# Patient Record
Sex: MALE | Race: WHITE | Employment: OTHER | URBAN - METROPOLITAN AREA
[De-identification: names, ages, dates, MRNs, and addresses within clinical notes are randomized per-mention and may not be internally consistent; named-entity substitution may affect disease eponyms.]

---

## 2018-07-17 ENCOUNTER — HOSPITAL ENCOUNTER (OUTPATIENT)
Dept: CARDIAC CATH/INVASIVE PROCEDURES | Age: 83
Discharge: HOME | End: 2018-07-17
Attending: INTERNAL MEDICINE | Admitting: INTERNAL MEDICINE
Payer: MEDICARE

## 2018-07-17 VITALS
HEIGHT: 73 IN | SYSTOLIC BLOOD PRESSURE: 134 MMHG | WEIGHT: 210 LBS | BODY MASS INDEX: 27.83 KG/M2 | HEART RATE: 60 BPM | OXYGEN SATURATION: 98 % | RESPIRATION RATE: 18 BRPM | DIASTOLIC BLOOD PRESSURE: 68 MMHG | TEMPERATURE: 98 F

## 2018-07-17 DIAGNOSIS — I44.2 AV BLOCK, 3RD DEGREE (HCC): Primary | ICD-10-CM

## 2018-07-17 PROCEDURE — 74011000250 HC RX REV CODE- 250: Performed by: INTERNAL MEDICINE

## 2018-07-17 PROCEDURE — 77030012935 HC DRSG AQUACEL BMS -B

## 2018-07-17 PROCEDURE — 77030031139 HC SUT VCRL2 J&J -A

## 2018-07-17 PROCEDURE — 77030037029 HC IMPL ENV ICD ANTIBACT ABSRB TYRX MEDT -G

## 2018-07-17 PROCEDURE — 74011250636 HC RX REV CODE- 250/636: Performed by: INTERNAL MEDICINE

## 2018-07-17 PROCEDURE — 77030039046 HC PAD DEFIB RADIOTRNSPNT CNMD -B

## 2018-07-17 PROCEDURE — 77030028700 HC BLD TISS PLSM MEDT -E

## 2018-07-17 PROCEDURE — 77030018673

## 2018-07-17 PROCEDURE — C1785 PMKR, DUAL, RATE-RESP: HCPCS

## 2018-07-17 PROCEDURE — 74011000272 HC RX REV CODE- 272: Performed by: INTERNAL MEDICINE

## 2018-07-17 PROCEDURE — 33228 REMV&REPLC PM GEN DUAL LEAD: CPT

## 2018-07-17 RX ORDER — MIDAZOLAM HYDROCHLORIDE 1 MG/ML
1-10 INJECTION, SOLUTION INTRAMUSCULAR; INTRAVENOUS
Status: DISCONTINUED | OUTPATIENT
Start: 2018-07-17 | End: 2018-07-17

## 2018-07-17 RX ORDER — ATROPINE SULFATE 0.1 MG/ML
.5-1 INJECTION INTRAVENOUS AS NEEDED
Status: DISCONTINUED | OUTPATIENT
Start: 2018-07-17 | End: 2018-07-17

## 2018-07-17 RX ORDER — LIDOCAINE HYDROCHLORIDE 10 MG/ML
10-30 INJECTION INFILTRATION; PERINEURAL
Status: DISCONTINUED | OUTPATIENT
Start: 2018-07-17 | End: 2018-07-17

## 2018-07-17 RX ORDER — HYDROCODONE BITARTRATE AND ACETAMINOPHEN 5; 325 MG/1; MG/1
1 TABLET ORAL
Qty: 20 TAB | Refills: 0 | Status: SHIPPED | OUTPATIENT
Start: 2018-07-17

## 2018-07-17 RX ORDER — CEFAZOLIN SODIUM/WATER 2 G/20 ML
2 SYRINGE (ML) INTRAVENOUS ONCE
Status: COMPLETED | OUTPATIENT
Start: 2018-07-17 | End: 2018-07-17

## 2018-07-17 RX ORDER — SODIUM CHLORIDE 0.9 % (FLUSH) 0.9 %
5 SYRINGE (ML) INJECTION AS NEEDED
Status: DISCONTINUED | OUTPATIENT
Start: 2018-07-17 | End: 2018-07-17

## 2018-07-17 RX ORDER — CEFAZOLIN SODIUM/WATER 2 G/20 ML
2 SYRINGE (ML) INTRAVENOUS ONCE
Status: DISCONTINUED | OUTPATIENT
Start: 2018-07-17 | End: 2018-07-17

## 2018-07-17 RX ORDER — FENTANYL CITRATE 50 UG/ML
25-200 INJECTION, SOLUTION INTRAMUSCULAR; INTRAVENOUS
Status: DISCONTINUED | OUTPATIENT
Start: 2018-07-17 | End: 2018-07-17

## 2018-07-17 RX ADMIN — SODIUM CHLORIDE 50000 UNITS: 900 IRRIGANT IRRIGATION at 08:48

## 2018-07-17 RX ADMIN — MIDAZOLAM HYDROCHLORIDE 2 MG: 1 INJECTION, SOLUTION INTRAMUSCULAR; INTRAVENOUS at 08:38

## 2018-07-17 RX ADMIN — FENTANYL CITRATE 25 MCG: 50 INJECTION, SOLUTION INTRAMUSCULAR; INTRAVENOUS at 08:38

## 2018-07-17 RX ADMIN — MIDAZOLAM HYDROCHLORIDE 1 MG: 1 INJECTION, SOLUTION INTRAMUSCULAR; INTRAVENOUS at 08:44

## 2018-07-17 RX ADMIN — MIDAZOLAM HYDROCHLORIDE 2 MG: 1 INJECTION, SOLUTION INTRAMUSCULAR; INTRAVENOUS at 08:30

## 2018-07-17 RX ADMIN — FENTANYL CITRATE 25 MCG: 50 INJECTION, SOLUTION INTRAMUSCULAR; INTRAVENOUS at 08:44

## 2018-07-17 RX ADMIN — LIDOCAINE HYDROCHLORIDE 20 ML: 10 INJECTION, SOLUTION INFILTRATION; PERINEURAL at 08:42

## 2018-07-17 RX ADMIN — FENTANYL CITRATE 25 MCG: 50 INJECTION, SOLUTION INTRAMUSCULAR; INTRAVENOUS at 08:50

## 2018-07-17 RX ADMIN — FENTANYL CITRATE 25 MCG: 50 INJECTION, SOLUTION INTRAMUSCULAR; INTRAVENOUS at 08:30

## 2018-07-17 RX ADMIN — Medication 2 G: at 08:18

## 2018-07-17 NOTE — PROGRESS NOTES
Cardiac Cath Lab Procedure Area Arrival Note:    Mane [place] arrived to Cardiac Cath Lab, Procedure Area. Patient identifiers verified with NAME and DATE OF BIRTH. Procedure verified with patient. Consent forms verified. Allergies verified. Patient informed of procedure and plan of care. Questions answered with review. Patient voiced understanding of procedure and plan of care. Patient on cardiac monitor, non-invasive blood pressure, SPO2 monitor. On RA and placed on O2 @ 2 lpm via NC.  IV of NSS on pump at 50 ml/hr. Patient status doing well without problems. Patient is A&Ox 4. Patient reports no complaints of pain or shortness of breath. Patient medicated during procedure with orders obtained and verified by Dr. Dee Umanzor. Refer to patients Cardiac Cath Lab PROCEDURE REPORT for vital signs, assessment, status, and response during procedure, printed at end of case. Printed report on chart or scanned into chart. 3631- Transfer to [address] [place] [address] from Procedure Area    Verbal report given to MADISON Maddox on [facility] being transferred to Cardiac Cath Lab  for routine progression of care   Patient is post PPM Generator Change procedure. Patient stable upon transfer to . Report consisted of patients Situation, Background, Assessment and   Recommendations(SBAR). Information from the following report(s) SBAR, Procedure Summary and MAR was reviewed with the receiving nurse. Opportunity for questions and clarification was provided. Patient medicated during procedure with orders obtained and verified by Dr. Dee Umanzor. Refer to patient PROCEDURE REPORT for vital signs, assessment, status, and response during procedure.

## 2018-07-17 NOTE — IP AVS SNAPSHOT
[address] 
[phone] Patient: Kyrie Huffman MRN: [id number] :[id number] About your hospitalization You were admitted on:  2018 You last received care in the:  [place] [address], [facility]/s Dr PEÑA SAUCEDO You were discharged on:  2018 Why you were hospitalized Your primary diagnosis was:  Not on File Follow-up Information Follow up With Details Comments Contact Info Governbertha Bowman MD   [address] [facility] [address] [place] [address] 
[phone] Discharge Orders None A check alonzo indicates which time of day the medication should be taken. My Medications START taking these medications Instructions Each Dose to Equal  
 Morning Noon Evening Bedtime HYDROcodone-acetaminophen 5-325 mg per tablet Commonly known as:  Albin Bae Your last dose was: Your next dose is: Take 1 Tab by mouth every four (4) hours as needed for Pain. Max Daily Amount: 6 Tabs. Indications: Pain 1 Tab CONTINUE taking these medications Instructions Each Dose to Equal  
 Morning Noon Evening Bedtime  
 amLODIPine 5 mg tablet Commonly known as:  Walter Lagos Your last dose was: Your next dose is: Take 5 mg by mouth daily. Indications: HYPERTENSION  
 5 mg  
    
   
   
   
  
 benazepril 40 mg tablet Commonly known as:  LOTENSIN Your last dose was: Your next dose is: Take 40 mg by mouth daily. 40 mg  
    
   
   
   
  
 omeprazole 20 mg capsule Commonly known as:  PRILOSEC Your last dose was: Your next dose is: Take 20 mg by mouth daily. 20 mg  
    
   
   
   
  
 oxyCODONE-acetaminophen 5-325 mg per tablet Commonly known as:  PERCOCET Your last dose was: Your next dose is: Take 1 Tab by mouth every four (4) hours as needed for Pain. Max Daily Amount: 6 Tabs. 1 Tab Where to Get Your Medications Information on where to get these meds will be given to you by the nurse or doctor. ! Ask your nurse or doctor about these medications HYDROcodone-acetaminophen 5-325 mg per tablet Opioid Education Prescription Opioids: What You Need to Know: 
 
Prescription opioids can be used to help relieve moderate-to-severe pain and are often prescribed following a surgery or injury, or for certain health conditions. These medications can be an important part of treatment but also come with serious risks. Opioids are strong pain medicines. Examples include hydrocodone, oxycodone, fentanyl, and morphine. Heroin is an example of an illegal opioid. It is important to work with your health care provider to make sure you are getting the safest, most effective care. WHAT ARE THE RISKS AND SIDE EFFECTS OF OPIOID USE? Prescription opioids carry serious risks of addiction and overdose, especially with prolonged use. An opioid overdose, often marked by slow breathing, can cause sudden death. The use of prescription opioids can have a number of side effects as well, even when taken as directed. · Tolerance-meaning you might need to take more of a medication for the same pain relief · Physical dependence-meaning you have symptoms of withdrawal when the medication is stopped. Withdrawal symptoms can include nausea, sweating, chills, diarrhea, stomach cramps, and muscle aches. Withdrawal can last up to several weeks, depending on which drug you took and how long you took it. · Increased sensitivity to pain · Constipation · Nausea, vomiting, and dry mouth · Sleepiness and dizziness · Confusion · Depression · Low levels of testosterone that can result in lower sex drive, energy, and strength · Itching and sweating RISKS ARE GREATER WITH:      
· History of drug misuse, substance use disorder, or overdose · Mental health conditions (such as depression or anxiety) · Sleep apnea · Older age (72 years or older) · Pregnancy Avoid alcohol while taking prescription opioids. Also, unless specifically advised by your health care provider, medications to avoid include: · Benzodiazepines (such as Xanax or Valium) · Muscle relaxants (such as Soma or Flexeril) · Hypnotics (such as Ambien or Lunesta) · Other prescription opioids KNOW YOUR OPTIONS Talk to your health care provider about ways to manage your pain that don't involve prescription opioids. Some of these options may actually work better and have fewer risks and side effects. Options may include: 
· Pain relievers such as acetaminophen, ibuprofen, and naproxen · Some medications that are also used for depression or seizures · Physical therapy and exercise · Counseling to help patients learn how to cope better with triggers of pain and stress. · Application of heat or cold compress · Massage therapy · Relaxation techniques Be Informed Make sure you know the name of your medication, how much and how often to take it, and its potential risks & side effects. IF YOU ARE PRESCRIBED OPIOIDS FOR PAIN: 
· Never take opioids in greater amounts or more often than prescribed. Remember the goal is not to be pain-free but to manage your pain at a tolerable level. · Follow up with your primary care provider to: · Work together to create a plan on how to manage your pain. · Talk about ways to help manage your pain that don't involve prescription opioids. · Talk about any and all concerns and side effects. · Help prevent misuse and abuse. · Never sell or share prescription opioids · Help prevent misuse and abuse. · Store prescription opioids in a secure place and out of reach of others (this may include visitors, children, friends, and family). · Safely dispose of unused/unwanted prescription opioids: Find your community drug take-back program or your pharmacy mail-back program, or flush them down the toilet, following guidance from the Food and Drug Administration (www.fda.gov/Drugs/ResourcesForYou). · Visit www.cdc.gov/drugoverdose to learn about the risks of opioid abuse and overdose. · If you believe you may be struggling with addiction, tell your health care provider and ask for guidance or call [employer] at 0-671-679-MYSI. Discharge Instructions PACEMAKER Generator Change INSTRUCTIONS SHEET 1. Do not drive for 48 hours 2. No signing and legal documents for 24hours. · Keep your incision dry for 7 days. DO NOT put salves, ointments, and/or lotions on the incision. Only take a tub bath during this time; NO showers. ·  
· Call us IMMEDIATELY if you develop fever, pain, redness, and/or drainage at the pacemaker arm. · Do not lift more than 10 pounds for 1 week. Introducing [facility]! Mary Huber introduces [employer] patient portal. Now you can access parts of your medical record, email your doctor's office, and request medication refills online. 1. In your internet browser, go to [email or web address] 2. Click on the First Time User? Click Here link in the Sign In box. You will see the New Member Sign Up page. 3. Enter your [employer] Access Code exactly as it appears below. You will not need to use this code after youve completed the sign-up process. If you do not sign up before the expiration date, you must request a new code. · [employer] Access Code: [email or web address] Expires: 10/1/2018 10:26 AM 
 
4. Enter the last four digits of your Social Security Number (xxxx) and Date of Birth (mm/dd/yyyy) as indicated and click Submit. You will be taken to the next sign-up page. 5. Create a [employer] ID. This will be your [employer] login ID and cannot be changed, so think of one that is secure and easy to remember. 6. Create a [employer] password. You can change your password at any time. 7. Enter your Password Reset Question and Answer. This can be used at a later time if you forget your password. 8. Enter your e-mail address. You will receive e-mail notification when new information is available in [facility] 9. Click Sign Up. You can now view and download portions of your medical record. 10. Click the Download Summary menu link to download a portable copy of your medical information. If you have questions, please visit the Frequently Asked Questions section of the [employer] website. Remember, [employer] is NOT to be used for urgent needs. For medical emergencies, dial 911. Now available from your iPhone and Android! Introducing Faisal Tidwell As a [facility] patient, I wanted to make you aware of our electronic visit tool called Faisal Aarontanyarosa. [facility] 24/7 allows you to connect within minutes with a medical provider 24 hours a day, seven days a week via a mobile device or tablet or logging into a secure website from your computer. You can access Faisal Tidwell from anywhere in the [place]. A virtual visit might be right for you when you have a simple condition and feel like you just dont want to get out of bed, or cant get away from work for an appointment, when your regular [facility] provider is not available (evenings, weekends or holidays), or when youre out of town and need minor care. Electronic visits cost only $49 and if the [facility] 24/7 provider determines a prescription is needed to treat your condition, one can be electronically transmitted to a nearby pharmacy*. Please take a moment to enroll today if you have not already done so. The enrollment process is free and takes just a few minutes.   To enroll, please download the [facility] 24/7 crystal to your tablet or phone, or visit [email or web address] to enroll on your computer. And, as an [address] patient with a [employer] account, the results of your visits will be scanned into your electronic medical record and your primary care provider will be able to view the scanned results. We urge you to continue to see your regular [facility] provider for your ongoing medical care. And while your primary care provider may not be the one available when you seek a [employer] virtual visit, the peace of mind you get from getting a real diagnosis real time can be priceless. For more information on [employer], view our Frequently Asked Questions (FAQs) at [email or web address]. Sincerely, 
 
José Quintanilla MD 
Chief Medical Officer [facility] *:  certain medications cannot be prescribed via [employer] Providers Seen During Your Hospitalization Provider Specialty Primary office phone Matteo Vela MD Cardiology [phone] Your Primary Care Physician (PCP) Primary Care Physician Office Phone Office Fax Sirimilla Pushpa [phone] You are allergic to the following Allergen Reactions Celebrex (Celecoxib) Other (comments) Ringing and pain in ears Recent Documentation Height Weight BMI Smoking Status 1.854 m 95.3 kg 27.71 kg/m2 Former Smoker Emergency Contacts Name Discharge Info Relation Home Work Mobile Veronique López DISCHARGE CAREGIVER [3] Child [2] [phone] Patient Belongings The following personal items are in your possession at time of discharge: 
     Visual Aid: None Please provide this summary of care documentation to your next provider.  
  
  
 
  
Signatures-by signing, you are acknowledging that this After Visit Summary has been reviewed with you and you have received a copy. Patient Signature:  ____________________________________________________________ Date:  ____________________________________________________________  
  
[facility] Provider Signature:  ____________________________________________________________ Date:  ____________________________________________________________

## 2018-07-17 NOTE — PROCEDURES
Indication: Pacemaker at electrive replacement. V paced >99% but EF 60-65% and no HF symptoms. PROCEDURES PERFORMED:  1. Conscious sedation. 2. REMOVAL OF Cardiac Rhythm Device [facility] SN [id number]  3. Permanent SJM Device  Implantation:      A: ASHLI Assurity MRi [id number]        COMPLICATIONS:None. ESTIMATED BLOOD LOSS: Minimal  SPECIMENS: None  ASSISTANTS: See Erwin    PROCEDURAL NOTE:  The patient was brought to the laboratory in a sedated postabsorptive state. Conscious sedation was administered by nursing staff under my supervision. The left chest wall was prepped and draped in the usual fashion and anesthetized with 20 mL of 2% lidocaine. Incision was made over the deltopectoral groove and extended to the level of the pectoralis fascia. A pocket was opened and the cardiac rhythm device was exposed and removed. The leads were inspected and noted to be visually intact. The leads were tested and thresholds were acceptable. The device pocket was flushed with antibiotic containing sterile saline  solution. The leads were then attached to generator. Leads and generator placed in the pocket with the leads posterior to the generator. Subcutaneous tissue closed in 2 layers utilizing #2-0 Vicryl. Skin closed with dermabond glue with an excellent final result. The patient was transferred to the holding area    No complications were noted. PACEMAKER THRESHOLD TESTIN. Ventricular R-wave:6.5millivolts; Ventricular capture threshold ( 0.5 milliseconds pulse width): Voltage 2.5 volts @ 0.4 msec, impedance 560 ohms,  Atrial P-wave: 2.1 millivolts;  Atrial capture threshold ( 0.5milliseconds pulse width): Voltage 0.5 volts, impedance 410 ohms

## 2018-07-17 NOTE — PROGRESS NOTES
Cardiac Cath Lab Recovery Arrival Note:      Waymond Goltz arrived to Cardiac Cath Lab, Recovery Area. Staff introduced to patient. Patient identifiers verified with NAME and DATE OF BIRTH. Procedure verified with patient. Consent forms reviewed and signed by patient or authorized representative and verified. Allergies verified. Patient and family oriented to department. Patient and family informed of procedure and plan of care. Questions answered with review. Patient prepped for procedure, per orders from physician, prior to arrival.    Patient on cardiac monitor, non-invasive blood pressure, SPO2 monitor. On RA. Patient is A&Ox 4. Patient reports no c/o's. Patient in stretcher, in low position, with side rails up, call bell within reach, patient instructed to call if assistance as needed. Patient prep in: [address], [place] [address].    Patient family has pager # n/a  Family in: hospital.   Prep by: Gentry Santos RN

## 2018-07-17 NOTE — PROGRESS NOTES
TRANSFER - IN REPORT:    Verbal report received from [facility] on Omari Salgado  being received from procedural area for routine progression of care. Report consisted of patients Situation, Background, Assessment and Recommendations(SBAR). Information from the following report(s) Procedure Summary, MAR and Recent Results was reviewed with the receiving clinician. Opportunity for questions and clarification was provided. Assessment completed upon patients arrival to [address] [place] [address] and care assumed. Cardiac Cath Lab Recovery Arrival Note:    Omari Salgado arrived to [facility] recovery area. Patient procedure= PPI. Patient on cardiac monitor, non-invasive blood pressure, SPO2 monitor. On RA   IV  of NS on pump at 25 ml/hr. Patient status doing well without problems. Patient is A&Ox 3. Patient reports no c/o's. PROCEDURE SITE CHECK:    Procedure site:without any bleeding and hematoma, no pain/discomfort reported at procedure site. No change in patient status. Continue to monitor patient and status.

## 2018-07-17 NOTE — DISCHARGE INSTRUCTIONS
PACEMAKER Generator Change INSTRUCTIONS SHEET  1. Do not drive for 48 hours    2. No signing and legal documents for 24hours. · Keep your incision dry for 7 days. DO NOT put salves, ointments, and/or lotions on the incision. Only take a tub bath during this time; NO showers. ·   · Call us IMMEDIATELY if you develop fever, pain, redness, and/or drainage at the pacemaker arm. · Do not lift more than 10 pounds for 1 week.

## 2020-08-11 NOTE — PROGRESS NOTES
HISTORY OF PRESENTING ILLNESS      Saima Morrell is a 81 y.o. male with hypertension, dyslipidemia and dual chamber SJM pacemaker implanted in 2012 followed by gen change in 2018 presenting to establish with our device clinic to follow his pacemaker. Interrogation of his PPM demonstrated an elevated RV lead threshold however this appears to potentially be chronic. Denies chest pain, syncope, palpitations. PAST MEDICAL HISTORY     Past Medical History:   Diagnosis Date    Anxiety     Arthritis     osteoarthritis    Cancer ([facility] [address]     Squamous Cell Skin Cancer-had to have nose replaced.     GERD (gastroesophageal reflux disease)     Goiter     High cholesterol     Hypertension     Ill-defined condition     NOSE BLEEDS    Ill-defined condition 12/2015    SEPSIS, UNKNOWN ORIGIN    Pacemaker 2012    Pneumonia 2/13/15    Unspecified adverse effect of anesthesia     difficulty breathing due to nose reconstruction    UTI (urinary tract infection) 2/3/15           PAST SURGICAL HISTORY     Past Surgical History:   Procedure Laterality Date    HX APPENDECTOMY      HX GYN      COLONOSCOPY    HX HEENT  2010    exc skin ca, reconstructive nose surgery    HX MOHS PROCEDURES  2010    left x2    HX ORTHOPAEDIC  2013    Herniated Disk in back-FUSION L4-5    HX ORTHOPAEDIC      Right Shoulder Scope    HX ORTHOPAEDIC      RIGHT WRIST AND SHOULDER MUSCLE REATTACHMENT    HX ORTHOPAEDIC      CERVICAL FUSION    HX PACEMAKER  2012    HX UROLOGICAL  2011    TURP    NEUROLOGICAL PROCEDURE UNLISTED  7/2011    FUSION OF CERVICAL VERTEBRA/PLATE    TOTAL KNEE ARTHROPLASTY  12/2011    BILATERAL          ALLERGIES     Allergies   Allergen Reactions    Celebrex [Celecoxib] Other (comments)     Ringing and pain in ears          FAMILY HISTORY     Family History   Problem Relation Age of Onset    Cancer Mother         breast, kidney, bone    Heart Disease Father     Cancer Brother         COLON    Cancer Daughter         PANCREATIC    Anesth Problems Neg Hx     negative for cardiac disease       SOCIAL HISTORY     Social History     Socioeconomic History    Marital status:      Spouse name: Not on file    Number of children: Not on file    Years of education: Not on file    Highest education level: Not on file   Tobacco Use    Smoking status: Former Smoker     Packs/day: 1.00     Years: 20.00     Pack years: 20.00     Last attempt to quit: 1980     Years since quittin.6    Smokeless tobacco: Never Used   Substance and Sexual Activity    Alcohol use: Not Currently     Alcohol/week: 4.2 standard drinks     Types: 5 Shots of liquor per week     Comment: 4-5/WEEK    Drug use: No         MEDICATIONS     Current Outpatient Medications   Medication Sig    Xarelto 20 mg tab tablet TK 1 T PO QD    donepeziL (ARICEPT) 5 mg tablet TK 1 T PO QD    atorvastatin (LIPITOR) 20 mg tablet Take  by mouth daily.  ergocalciferol (ERGOCALCIFEROL) 1,250 mcg (50,000 unit) capsule ergocalciferol (vitamin D2) 1,250 mcg (50,000 unit) capsule   TAKE 1 CAPSULE BY MOUTH EVERY WEEK    vit A-vit C-vit E-zinc-copper (Eye Vitamin and Minerals) 7,160-113-100 unit-mg-unit tab Take  by mouth daily.  omeprazole (PRILOSEC) 20 mg capsule Take 20 mg by mouth daily.  HYDROcodone-acetaminophen (NORCO) 5-325 mg per tablet Take 1 Tab by mouth every four (4) hours as needed for Pain. Max Daily Amount: 6 Tabs. Indications: Pain    oxyCODONE-acetaminophen (PERCOCET) 5-325 mg per tablet Take 1 Tab by mouth every four (4) hours as needed for Pain. Max Daily Amount: 6 Tabs.  benazepril (LOTENSIN) 40 mg tablet Take 40 mg by mouth daily.  amlodipine (NORVASC) 5 mg tablet Take 5 mg by mouth daily. Indications: HYPERTENSION     No current facility-administered medications for this visit. I have reviewed the nurses notes, vitals, problem list, allergy list, medical history, family, social history and medications. REVIEW OF SYMPTOMS      General: Pt denies excessive weight gain or loss. Pt is able to conduct ADL's  HEENT: Denies blurred vision, headaches, hearing loss, epistaxis and difficulty swallowing. Respiratory: Denies cough, congestion, shortness of breath, AGUILAR, wheezing or stridor. Cardiovascular: Denies precordial pain, palpitations, edema or PND  Gastrointestinal: Denies poor appetite, indigestion, abdominal pain or blood in stool  Genitourinary: Denies hematuria, dysuria, increased urinary frequency  Musculoskeletal: Denies joint pain or swelling from muscles or joints  Neurologic: Denies tremor, paresthesias, headache, or sensory motor disturbance  Psychiatric: Denies confusion, insomnia, depression  Integumentray: Denies rash, itching or ulcers. Hematologic: Denies easy bruising, bleeding       PHYSICAL EXAMINATION      Vitals: see vitals section  General: Well developed, in no acute distress. HEENT: No jaundice, oral mucosa moist, no oral ulcers  Neck: Supple, no stiffness, no lymphadenopathy, supple  Heart:  Normal S1/S2 negative S3 or S4. Regular, no murmur, gallop or rub, no jugular venous distention  Respiratory: Clear bilaterally x 4, no wheezing or rales  Abdomen:   Soft, non-tender, bowel sounds are active. Extremities:  No edema, normal cap refill, no cyanosis. Musculoskeletal: No clubbing, no deformities  Neuro: A&Ox3, speech clear, gait stable, cooperative, no focal neurologic deficits  Skin: Skin color is normal. No rashes or lesions.  Non diaphoretic, moist.  Vascular: 2+ pulses symmetric in all extremities       DIAGNOSTIC DATA      EKG:        LABORATORY DATA      Lab Results   Component Value Date/Time    WBC 9.3 04/17/2016 02:42 AM    Hemoglobin (POC) 13.3 11/14/2013 11:05 AM    HGB 9.6 (L) 06/25/2016 04:56 AM    Hematocrit (POC) 39 11/14/2013 11:05 AM    HCT 28.6 (L) 04/17/2016 02:42 AM    PLATELET 527 39/32/2106 02:42 AM    MCV 86.1 04/17/2016 02:42 AM      Lab Results   Component Value Date/Time    Sodium 137 06/23/2016 03:55 AM    Potassium 4.3 06/23/2016 03:55 AM    Chloride 105 06/23/2016 03:55 AM    CO2 24 06/23/2016 03:55 AM    Anion gap 8 06/23/2016 03:55 AM    Glucose 119 (H) 06/23/2016 03:55 AM    BUN 13 06/23/2016 03:55 AM    Creatinine 0.85 06/23/2016 03:55 AM    BUN/Creatinine ratio 15 06/23/2016 03:55 AM    GFR est AA >60 06/23/2016 03:55 AM    GFR est non-AA >60 06/23/2016 03:55 AM    Calcium 8.1 (L) 06/23/2016 03:55 AM    Bilirubin, total 1.2 (H) 12/08/2015 03:40 AM    Alk. phosphatase 110 12/08/2015 03:40 AM    Protein, total 6.5 12/08/2015 03:40 AM    Albumin 2.6 (L) 12/08/2015 03:40 AM    Globulin 3.9 12/08/2015 03:40 AM    A-G Ratio 0.7 (L) 12/08/2015 03:40 AM    ALT (SGPT) 24 12/08/2015 03:40 AM           ASSESSMENT      1. Pacemaker   A. SJM   B. Left sided   C. Dual chamber  2. Hypertension   3. Dyslipidemia       PLAN     Continue monitoring in device clinic with remote monitoring. ICD-10-CM ICD-9-CM    1. AV block, 3rd degree (HCC)  I44.2 426.0 AMB POC EKG ROUTINE W/ 12 LEADS, INTER & REP   2. Pacemaker  Z95.0 V45.01 AMB POC EKG ROUTINE W/ 12 LEADS, INTER & REP     Orders Placed This Encounter    AMB POC EKG ROUTINE W/ 12 LEADS, INTER & REP     Order Specific Question:   Reason for Exam:     Answer:   new patient    Xarelto 20 mg tab tablet     Sig: TK 1 T PO QD    donepeziL (ARICEPT) 5 mg tablet     Sig: TK 1 T PO QD    atorvastatin (LIPITOR) 20 mg tablet     Sig: Take  by mouth daily.  ergocalciferol (ERGOCALCIFEROL) 1,250 mcg (50,000 unit) capsule     Sig: ergocalciferol (vitamin D2) 1,250 mcg (50,000 unit) capsule   TAKE 1 CAPSULE BY MOUTH EVERY WEEK    vit A-vit C-vit E-zinc-copper (Eye Vitamin and Minerals) 7,160-113-100 unit-mg-unit tab     Sig: Take  by mouth daily. FOLLOW-UP     1 year      Thank you, Marita Balderas MD and Dr. Hilton Hilario for allowing me to participate in the care of this extraordinarily pleasant male.  Please do not hesitate to contact me for further questions/concerns.          Jair Milner MD  Cardiac Electrophysiology / Cardiology    [address]  [address] [facility], [facility], [place] [address]  [facility]    Robbi Eagle  [phone] / [phone] Fax   [phone] / [phone] Fax

## 2020-08-12 ENCOUNTER — OFFICE VISIT (OUTPATIENT)
Dept: CARDIOLOGY CLINIC | Age: 85
End: 2020-08-12
Payer: MEDICARE

## 2020-08-12 ENCOUNTER — CLINICAL SUPPORT (OUTPATIENT)
Dept: CARDIOLOGY CLINIC | Age: 85
End: 2020-08-12
Payer: MEDICARE

## 2020-08-12 VITALS
BODY MASS INDEX: 27.71 KG/M2 | DIASTOLIC BLOOD PRESSURE: 40 MMHG | RESPIRATION RATE: 16 BRPM | SYSTOLIC BLOOD PRESSURE: 100 MMHG | OXYGEN SATURATION: 100 % | HEART RATE: 82 BPM | HEIGHT: 73 IN

## 2020-08-12 DIAGNOSIS — Z95.0 PACEMAKER: Primary | ICD-10-CM

## 2020-08-12 DIAGNOSIS — Z95.0 PACEMAKER: ICD-10-CM

## 2020-08-12 DIAGNOSIS — I44.2 AV BLOCK, 3RD DEGREE (HCC): Primary | ICD-10-CM

## 2020-08-12 PROCEDURE — 93010 ELECTROCARDIOGRAM REPORT: CPT | Performed by: INTERNAL MEDICINE

## 2020-08-12 PROCEDURE — G8419 CALC BMI OUT NRM PARAM NOF/U: HCPCS | Performed by: INTERNAL MEDICINE

## 2020-08-12 PROCEDURE — 93280 PM DEVICE PROGR EVAL DUAL: CPT

## 2020-08-12 PROCEDURE — G8432 DEP SCR NOT DOC, RNG: HCPCS | Performed by: INTERNAL MEDICINE

## 2020-08-12 PROCEDURE — 99205 OFFICE O/P NEW HI 60 MIN: CPT | Performed by: INTERNAL MEDICINE

## 2020-08-12 PROCEDURE — G8427 DOCREV CUR MEDS BY ELIG CLIN: HCPCS | Performed by: INTERNAL MEDICINE

## 2020-08-12 PROCEDURE — G8536 NO DOC ELDER MAL SCRN: HCPCS | Performed by: INTERNAL MEDICINE

## 2020-08-12 PROCEDURE — 1101F PT FALLS ASSESS-DOCD LE1/YR: CPT | Performed by: INTERNAL MEDICINE

## 2020-08-12 RX ORDER — RIVAROXABAN 20 MG/1
TABLET, FILM COATED ORAL
COMMUNITY
Start: 2020-08-07

## 2020-08-12 RX ORDER — ATORVASTATIN CALCIUM 20 MG/1
TABLET, FILM COATED ORAL DAILY
COMMUNITY

## 2020-08-12 RX ORDER — ERGOCALCIFEROL 1.25 MG/1
CAPSULE ORAL
COMMUNITY

## 2020-08-12 RX ORDER — DONEPEZIL HYDROCHLORIDE 5 MG/1
TABLET, FILM COATED ORAL
COMMUNITY
Start: 2020-08-07

## 2020-08-12 NOTE — PROGRESS NOTES
ROOM # 3  Has seen Sharla Diana in the past  Denies any cardiac complaints at this time  Visit Vitals  /40 (BP 1 Location: Left arm, BP Patient Position: Sitting)   Pulse 82   Resp 16   Ht 6' 1\" (1.854 m)   SpO2 100%   BMI 27.71 kg/m²

## 2021-05-31 NOTE — PROGRESS NOTES
Left message for patient to call back. If patient calls back, please relay message below.     Pt discharged home with daughter. Discharge instructions given and understanding verbalized of follow up. Upper left chest dressing remains dry and intact, no bleeding or hematoma noted. Pt voices no c/o's upon discharge. Pt ambulated before discharge and tolerated well, voided qs. IV(#22) discontinued in left FA, site without redness or swelling.

## 2021-09-03 ENCOUNTER — CLINICAL SUPPORT (OUTPATIENT)
Dept: CARDIOLOGY CLINIC | Age: 86
End: 2021-09-03

## 2021-09-03 ENCOUNTER — OFFICE VISIT (OUTPATIENT)
Dept: CARDIOLOGY CLINIC | Age: 86
End: 2021-09-03
Payer: MEDICARE

## 2021-09-03 VITALS
BODY MASS INDEX: 23.86 KG/M2 | OXYGEN SATURATION: 97 % | HEIGHT: 73 IN | HEART RATE: 76 BPM | SYSTOLIC BLOOD PRESSURE: 90 MMHG | DIASTOLIC BLOOD PRESSURE: 60 MMHG | WEIGHT: 180 LBS

## 2021-09-03 DIAGNOSIS — Z95.0 CARDIAC PACEMAKER IN SITU: Primary | ICD-10-CM

## 2021-09-03 PROCEDURE — G8427 DOCREV CUR MEDS BY ELIG CLIN: HCPCS | Performed by: INTERNAL MEDICINE

## 2021-09-03 PROCEDURE — G8536 NO DOC ELDER MAL SCRN: HCPCS | Performed by: INTERNAL MEDICINE

## 2021-09-03 PROCEDURE — G8432 DEP SCR NOT DOC, RNG: HCPCS | Performed by: INTERNAL MEDICINE

## 2021-09-03 PROCEDURE — G0463 HOSPITAL OUTPT CLINIC VISIT: HCPCS | Performed by: INTERNAL MEDICINE

## 2021-09-03 PROCEDURE — 1101F PT FALLS ASSESS-DOCD LE1/YR: CPT | Performed by: INTERNAL MEDICINE

## 2021-09-03 PROCEDURE — G8420 CALC BMI NORM PARAMETERS: HCPCS | Performed by: INTERNAL MEDICINE

## 2021-09-03 PROCEDURE — 99215 OFFICE O/P EST HI 40 MIN: CPT | Performed by: INTERNAL MEDICINE

## 2021-09-03 NOTE — PROGRESS NOTES
HISTORY OF PRESENTING ILLNESS      Rossana Osuna is a 81 y.o. male with hypertension, dyslipidemia and dual chamber SJM pacemaker implanted in 2012 followed by gen change in 2018 with history of chronically elevated RV threshold presenting for follow-up of his pacemaker. Denied chest pain, shortness of breath, syncope, lower extreme edema. Device interrogation demonstrates normal device functioning aside from his chronic elevated RV lead threshold. He continues to tolerate his Xarelto without issue. PAST MEDICAL HISTORY     Past Medical History:   Diagnosis Date    Anxiety     Arthritis     osteoarthritis    Cancer ([address]     Squamous Cell Skin Cancer-had to have nose replaced.     GERD (gastroesophageal reflux disease)     Goiter     High cholesterol     Hypertension     Ill-defined condition     NOSE BLEEDS    Ill-defined condition 12/2015    SEPSIS, UNKNOWN ORIGIN    Pacemaker 2012    Pneumonia 2/13/15    Unspecified adverse effect of anesthesia     difficulty breathing due to nose reconstruction    UTI (urinary tract infection) 2/3/15           PAST SURGICAL HISTORY     Past Surgical History:   Procedure Laterality Date    HX APPENDECTOMY      HX GYN      COLONOSCOPY    HX HEENT  2010    exc skin ca, reconstructive nose surgery    HX MOHS PROCEDURES  2010    left x2    HX ORTHOPAEDIC  2013    Herniated Disk in back-FUSION L4-5    HX ORTHOPAEDIC      Right Shoulder Scope    HX ORTHOPAEDIC      RIGHT WRIST AND SHOULDER MUSCLE REATTACHMENT    HX ORTHOPAEDIC      CERVICAL FUSION    HX PACEMAKER  2012    HX UROLOGICAL  2011    TURP    NEUROLOGICAL PROCEDURE UNLISTED  7/2011    FUSION OF CERVICAL VERTEBRA/PLATE    TOTAL KNEE ARTHROPLASTY  12/2011    BILATERAL          ALLERGIES     Allergies   Allergen Reactions    Celebrex [Celecoxib] Other (comments)     Ringing and pain in ears          FAMILY HISTORY     Family History   Problem Relation Age of Onset    Cancer Mother breast, kidney, bone    Heart Disease Father     Cancer Brother         COLON    Cancer Daughter         PANCREATIC    Anesth Problems Neg Hx     negative for cardiac disease       SOCIAL HISTORY     Social History     Socioeconomic History    Marital status:      Spouse name: Not on file    Number of children: Not on file    Years of education: Not on file    Highest education level: Not on file   Tobacco Use    Smoking status: Former Smoker     Packs/day: 1.00     Years: 20.00     Pack years: 20.00     Quit date: 1980     Years since quittin.7    Smokeless tobacco: Never Used   Substance and Sexual Activity    Alcohol use: Not Currently     Alcohol/week: 4.2 standard drinks     Types: 5 Shots of liquor per week     Comment: 4-5/WEEK    Drug use: No     Social Determinants of Health     Financial Resource Strain:     Difficulty of Paying Living Expenses:    Food Insecurity:     Worried About Running Out of Food in the Last Year:     Ran Out of Food in the Last Year:    Transportation Needs:     Lack of Transportation (Medical):  Lack of Transportation (Non-Medical):    Physical Activity:     Days of Exercise per Week:     Minutes of Exercise per Session:    Stress:     Feeling of Stress :    Social Connections:     Frequency of Communication with Friends and Family:     Frequency of Social Gatherings with Friends and Family:     Attends [employer] Services:     Active Member of Clubs or Organizations:     Attends Club or Organization Meetings:     Marital Status:          MEDICATIONS     Current Outpatient Medications   Medication Sig    Xarelto 20 mg tab tablet TK 1 T PO QD    donepeziL (ARICEPT) 5 mg tablet TK 1 T PO QD    atorvastatin (LIPITOR) 20 mg tablet Take  by mouth daily.     ergocalciferol (ERGOCALCIFEROL) 1,250 mcg (50,000 unit) capsule ergocalciferol (vitamin D2) 1,250 mcg (50,000 unit) capsule   TAKE 1 CAPSULE BY MOUTH EVERY WEEK    vit A-vit C-vit E-zinc-copper (Eye Vitamin and Minerals) 7,160-113-100 unit-mg-unit tab Take  by mouth daily.  HYDROcodone-acetaminophen (NORCO) 5-325 mg per tablet Take 1 Tab by mouth every four (4) hours as needed for Pain. Max Daily Amount: 6 Tabs. Indications: Pain    oxyCODONE-acetaminophen (PERCOCET) 5-325 mg per tablet Take 1 Tab by mouth every four (4) hours as needed for Pain. Max Daily Amount: 6 Tabs.  omeprazole (PRILOSEC) 20 mg capsule Take 20 mg by mouth daily.  benazepril (LOTENSIN) 40 mg tablet Take 40 mg by mouth daily.  amlodipine (NORVASC) 5 mg tablet Take 5 mg by mouth daily. Indications: HYPERTENSION     No current facility-administered medications for this visit. I have reviewed the nurses notes, vitals, problem list, allergy list, medical history, family, social history and medications. REVIEW OF SYMPTOMS      General: Pt denies excessive weight gain or loss. Pt is able to conduct ADL's  HEENT: Denies blurred vision, headaches, hearing loss, epistaxis and difficulty swallowing. Respiratory: Denies cough, congestion, shortness of breath, AGUILAR, wheezing or stridor. Cardiovascular: Denies precordial pain, palpitations, edema or PND  Gastrointestinal: Denies poor appetite, indigestion, abdominal pain or blood in stool  Genitourinary: Denies hematuria, dysuria, increased urinary frequency  Musculoskeletal: Denies joint pain or swelling from muscles or joints  Neurologic: Denies tremor, paresthesias, headache, or sensory motor disturbance  Psychiatric: Denies confusion, insomnia, depression  Integumentray: Denies rash, itching or ulcers. Hematologic: Denies easy bruising, bleeding       PHYSICAL EXAMINATION      Vitals: see vitals section  General: Well developed, in no acute distress. HEENT: No jaundice, oral mucosa moist, no oral ulcers  Neck: Supple, no stiffness, no lymphadenopathy, supple  Heart:  Normal S1/S2 negative S3 or S4.  Regular, no murmur, gallop or rub, no jugular venous distention  Respiratory: Clear bilaterally x 4, no wheezing or rales  Abdomen:   Soft, non-tender, bowel sounds are active. Extremities:  No edema, normal cap refill, no cyanosis. Musculoskeletal: No clubbing, no deformities  Neuro: A&Ox3, speech clear, gait stable, cooperative, no focal neurologic deficits  Skin: Skin color is normal. No rashes or lesions. Non diaphoretic, moist.  Vascular: 2+ pulses symmetric in all extremities       DIAGNOSTIC DATA      EKG:        LABORATORY DATA      Lab Results   Component Value Date/Time    WBC 9.3 04/17/2016 02:42 AM    Hemoglobin (POC) 13.3 11/14/2013 11:05 AM    HGB 9.6 (L) 06/25/2016 04:56 AM    Hematocrit (POC) 39 11/14/2013 11:05 AM    HCT 28.6 (L) 04/17/2016 02:42 AM    PLATELET 757 13/46/9619 02:42 AM    MCV 86.1 04/17/2016 02:42 AM      Lab Results   Component Value Date/Time    Sodium 137 06/23/2016 03:55 AM    Potassium 4.3 06/23/2016 03:55 AM    Chloride 105 06/23/2016 03:55 AM    CO2 24 06/23/2016 03:55 AM    Anion gap 8 06/23/2016 03:55 AM    Glucose 119 (H) 06/23/2016 03:55 AM    BUN 13 06/23/2016 03:55 AM    Creatinine 0.85 06/23/2016 03:55 AM    BUN/Creatinine ratio 15 06/23/2016 03:55 AM    GFR est AA >60 06/23/2016 03:55 AM    GFR est non-AA >60 06/23/2016 03:55 AM    Calcium 8.1 (L) 06/23/2016 03:55 AM    Bilirubin, total 1.2 (H) 12/08/2015 03:40 AM    Alk. phosphatase 110 12/08/2015 03:40 AM    Protein, total 6.5 12/08/2015 03:40 AM    Albumin 2.6 (L) 12/08/2015 03:40 AM    Globulin 3.9 12/08/2015 03:40 AM    A-G Ratio 0.7 (L) 12/08/2015 03:40 AM    ALT (SGPT) 24 12/08/2015 03:40 AM           ASSESSMENT      1. Pacemaker   A. SJM   B. Left sided   C. Dual chamber  2. Hypertension   3. Dyslipidemia       PLAN     Continue monitoring in device clinic with remote monitoring. FOLLOW-UP     1 year      Thank you, Benito Jay MD and Dr. Francine Shaw for allowing me to participate in the care of this extraordinarily pleasant male.  Please do not hesitate to contact me for further questions/concerns.          Joel Redd MD  Cardiac Electrophysiology / Cardiology    [address]  [address] [facility], [facility], [place] [address]  [address]    Tessie Eagle  [phone] / [phone] Fax   [phone] / [phone] Fax

## 2021-09-03 NOTE — PROGRESS NOTES
Cassie Snellen is a 81 y.o. male    There were no vitals taken for this visit.     Chief Complaint   Patient presents with    Other       Chest pain NO  SOB NO  Dizziness NO  Swelling NO  Recent hospital visit NO  Refills NO  COVID VACCINE STATUS YES

## 2021-11-16 ENCOUNTER — OFFICE VISIT (OUTPATIENT)
Dept: CARDIOLOGY CLINIC | Age: 86
End: 2021-11-16
Payer: MEDICARE

## 2021-11-16 DIAGNOSIS — Z95.0 CARDIAC PACEMAKER IN SITU: Primary | ICD-10-CM

## 2021-11-16 PROCEDURE — 93296 REM INTERROG EVL PM/IDS: CPT | Performed by: NURSE PRACTITIONER

## 2022-02-15 ENCOUNTER — OFFICE VISIT (OUTPATIENT)
Dept: CARDIOLOGY CLINIC | Age: 87
End: 2022-02-15
Payer: MEDICARE

## 2022-02-15 DIAGNOSIS — Z95.0 CARDIAC PACEMAKER IN SITU: Primary | ICD-10-CM

## 2022-02-15 PROCEDURE — 93296 REM INTERROG EVL PM/IDS: CPT | Performed by: INTERNAL MEDICINE

## 2022-02-15 NOTE — LETTER
2/15/2022 8:43 AM    Mr. Marilu Aldridge  [address] [facility] [address]      Dear Mr. Marilu Aldridge,    We have received your recent remote monitor check of your implanted device on 2/15/2022. Your remaining estimated battery life is 6.2 years and your device is working normally & appropriately. Your next remote monitor check is scheduled for 5/17/2022. This is NOT an in-clinic appointment. This transmission is sent from your home monitor. Please make sure your home monitor is plugged into power and within 10 feet of where you sleep. If you have difficulty sending a transmission, please do NOT call our office. Instead, call tech support for your device as they are better able to assist.    [facility])   [phone]    Your next clinic/office check is scheduled for Monday, 9/19/2022 at 2:00 pm.  You will have your device checked then see the provider. Please bring a complete list of your medications with strengths and dosages to this appointment. Also, be prepared to discuss any symptoms you may be having since your last visit. Please plan to arrive 10 minutes early to allow time for check-in. [facility] parking is CLOSED once again, due to Elmo Castro. The parking lot entrance that is next to [address] is also CLOSED. If you have difficulty walking from the parking lot, please arrange to have someone drop you off to allow time to check into the office. You are allowed to have one visitor accompany you to your appointment and no children under the age of 13 are allowed. Masks are mandatory while in the building. If you have any questions, please call the Pacemaker/ICD clinic at the [facility] location at [phone]. We appreciate you staying remotely connected! Sincerely,    Vinod HINES, RN  Cardiac Device Clinic Coordinator  [facility] [address] [place] [address].  [address] [place] [address], [id number] [place] [address]  [phone]

## 2022-07-21 ENCOUNTER — TELEPHONE (OUTPATIENT)
Dept: CARDIOLOGY CLINIC | Age: 87
End: 2022-07-21

## 2022-07-21 NOTE — TELEPHONE ENCOUNTER
Pt daughter would like to know if manual transmission was received. Please advise.      [phone]

## 2022-07-26 ENCOUNTER — OFFICE VISIT (OUTPATIENT)
Dept: CARDIOLOGY CLINIC | Age: 87
End: 2022-07-26
Payer: MEDICARE

## 2022-07-26 DIAGNOSIS — Z95.0 CARDIAC PACEMAKER IN SITU: Primary | ICD-10-CM

## 2022-07-26 PROCEDURE — 93294 REM INTERROG EVL PM/LDLS PM: CPT | Performed by: INTERNAL MEDICINE
